# Patient Record
Sex: FEMALE | Race: WHITE
[De-identification: names, ages, dates, MRNs, and addresses within clinical notes are randomized per-mention and may not be internally consistent; named-entity substitution may affect disease eponyms.]

---

## 2017-09-06 ENCOUNTER — HOSPITAL ENCOUNTER (OUTPATIENT)
Dept: HOSPITAL 53 - M RAD | Age: 62
End: 2017-09-06
Attending: SPECIALIST
Payer: COMMERCIAL

## 2017-09-06 DIAGNOSIS — J32.0: Primary | ICD-10-CM

## 2017-09-06 DIAGNOSIS — J34.2: ICD-10-CM

## 2017-09-07 NOTE — REP
MAXILLOFACIAL CT WITHOUT CONTRAST:

 

HISTORY:  Chronic sinusitis.

 

Bilateral Rosas cells are present.  A retention cyst or polyp is present in the

right maxillary sinus.  The remaining sinuses are clear.  The ostiomeatal units

are patent.  The middle and inferior nasal turbinates are partially paradoxical.

There is mild deviation of the nasal septum to the right.  A spur is present

arising from the right side of the nasal septum.  The spur abuts the right

inferior nasal turbinate.  The cribriform plate, medial walls of the orbits and

optic canals are intact.  The carotid canals form a segment of the posterolateral

walls of the sphenoid sinus.  The sphenoid sinus septum inserts into the right

internal carotid canal wall.

 

IMPRESSION:

 

Right maxillary sinus retention cyst or polyp.

 

 

Signed by

Stanislav Wade MD 09/07/2017 08:41 A

## 2018-04-18 ENCOUNTER — HOSPITAL ENCOUNTER (OUTPATIENT)
Dept: HOSPITAL 53 - M SFHCCLAY | Age: 63
End: 2018-04-18
Attending: NURSE PRACTITIONER
Payer: COMMERCIAL

## 2018-04-18 DIAGNOSIS — N39.0: Primary | ICD-10-CM

## 2018-04-18 PROCEDURE — 87088 URINE BACTERIA CULTURE: CPT

## 2018-04-18 PROCEDURE — 87186 SC STD MICRODIL/AGAR DIL: CPT

## 2018-06-13 ENCOUNTER — HOSPITAL ENCOUNTER (OUTPATIENT)
Dept: HOSPITAL 53 - M CLY | Age: 63
End: 2018-06-13
Attending: NURSE PRACTITIONER
Payer: COMMERCIAL

## 2018-06-13 DIAGNOSIS — M85.88: ICD-10-CM

## 2018-06-13 DIAGNOSIS — M51.36: Primary | ICD-10-CM

## 2018-06-13 DIAGNOSIS — I87.8: ICD-10-CM

## 2018-06-13 PROCEDURE — 72110 X-RAY EXAM L-2 SPINE 4/>VWS: CPT

## 2018-07-18 ENCOUNTER — HOSPITAL ENCOUNTER (OUTPATIENT)
Dept: HOSPITAL 53 - M SFHCCLAY | Age: 63
End: 2018-07-18
Attending: NURSE PRACTITIONER
Payer: COMMERCIAL

## 2018-07-18 DIAGNOSIS — Z00.00: Primary | ICD-10-CM

## 2018-07-18 LAB
25(OH)D3 SERPL-MCNC: 49.5 NG/ML (ref 30–100)
ALBUMIN/GLOBULIN RATIO: 1.21 (ref 1–1.93)
ALBUMIN: 4 GM/DL (ref 3.2–5.2)
ALKALINE PHOSPHATASE: 90 U/L (ref 45–117)
ALT SERPL W P-5'-P-CCNC: 31 U/L (ref 12–78)
ANION GAP: 8 MEQ/L (ref 8–16)
AST SERPL-CCNC: 15 U/L (ref 7–37)
BILIRUBIN,TOTAL: 0.8 MG/DL (ref 0.2–1)
BLOOD UREA NITROGEN: 12 MG/DL (ref 7–18)
CALCIUM LEVEL: 8.9 MG/DL (ref 8.8–10.2)
CARBON DIOXIDE LEVEL: 27 MEQ/L (ref 21–32)
CHLORIDE LEVEL: 107 MEQ/L (ref 98–107)
CHOLEST SERPL-MCNC: 235 MG/DL (ref ?–200)
CHOLESTEROL RISK RATIO: 4.43 (ref ?–5)
CREATININE FOR GFR: 0.88 MG/DL (ref 0.55–1.3)
GFR SERPL CREATININE-BSD FRML MDRD: > 60 ML/MIN/{1.73_M2} (ref 45–?)
GLUCOSE, FASTING: 120 MG/DL (ref 70–100)
HDLC SERPL-MCNC: 53 MG/DL (ref 40–?)
LDL CHOLESTEROL: 152.6 MG/DL (ref ?–100)
NONHDLC SERPL-MCNC: 182 MG/DL
POTASSIUM SERUM: 4 MEQ/L (ref 3.5–5.1)
SODIUM LEVEL: 142 MEQ/L (ref 136–145)
THYROID STIMULATING HORMONE: 1.64 UIU/ML (ref 0.36–3.74)
TOTAL PROTEIN: 7.3 GM/DL (ref 6.4–8.2)
TRIGLYCERIDES LEVEL: 147 MG/DL (ref ?–150)

## 2018-07-18 PROCEDURE — 84443 ASSAY THYROID STIM HORMONE: CPT
